# Patient Record
Sex: MALE
[De-identification: names, ages, dates, MRNs, and addresses within clinical notes are randomized per-mention and may not be internally consistent; named-entity substitution may affect disease eponyms.]

---

## 2018-10-26 ENCOUNTER — HOSPITAL ENCOUNTER (EMERGENCY)
Dept: HOSPITAL 14 - H.ER | Age: 43
Discharge: HOME | End: 2018-10-26
Payer: COMMERCIAL

## 2018-10-26 VITALS
OXYGEN SATURATION: 100 % | SYSTOLIC BLOOD PRESSURE: 128 MMHG | TEMPERATURE: 98.2 F | RESPIRATION RATE: 18 BRPM | DIASTOLIC BLOOD PRESSURE: 80 MMHG | HEART RATE: 95 BPM
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
## 2018-10-26 DIAGNOSIS — S80.222A: Primary | ICD-10-CM

## 2018-10-26 NOTE — ED PDOC
Lower Extremity Pain/Injury


Time Seen by Provider: 10/26/18 14:38


Chief Complaint (Nursing): Lower Extremity Problem/Injury


Chief Complaint (Provider): Lower Extremity Problem/Injury


History Per: Patient


History/Exam Limitations: no limitations


Onset/Duration Of Symptoms: Days (x1)


Current Symptoms Are (Timing): Still Present


Additional Complaint(s): 


Skip Flores, a 42 year old male, presents to the ED with left knee pain with 

pimple x 1 week. The patient denies fever, trauma, or antipyretic use.





PCP: none








Past Medical History


Reviewed: Historical Data, Nursing Documentation, Vital Signs


Vital Signs: 





                                Last Vital Signs











Temp  98.2 F   10/26/18 14:27


 


Pulse  95 H  10/26/18 14:27


 


Resp  18   10/26/18 14:27


 


BP  128/80   10/26/18 14:27


 


Pulse Ox  100   10/26/18 14:27














- Family History


Family History: States: Unknown Family Hx





- Home Medications


Home Medications: 


                                Ambulatory Orders











 Medication  Instructions  Recorded


 


RX: Clindamycin [Cleocin] 300 mg PO TID #21 cap 10/26/18














- Allergies


Allergies/Adverse Reactions: 


                                    Allergies











Allergy/AdvReac Type Severity Reaction Status Date / Time


 


antibiotic Allergy  RASH Uncoded 10/26/18 14:27














Review of Systems


ROS Statement: Except As Marked, All Systems Reviewed And Found Negative


Constitutional: Negative for: Fever


Musculoskeletal: Positive for: Leg Pain (Left knee with pimple,).  Negative for:

 Other (trauma)





Physical Exam





- Reviewed


Nursing Documentation Reviewed: Yes


Vital Signs Reviewed: Yes





- Physical Exam


Appears: Positive for: No Acute Distress


Pulses-Dorsalis Pedis (L): 2+


Pulses-Dorsalis Pedis (R): 2+


Extremity: Positive for: Normal ROM (left knee actively with one pustule).  

Negative for: Other (break in skin integrity, surrounding erythema, or 

fluctuance to left knee)





- ECG


O2 Sat by Pulse Oximetry: 100





- Progress


ED Course And Treament: 





Pt. states he is allergic to all antibiotics but is uncertain as to which 

specific antibiotic. Also states that he gets a variety of reactions such as 

itchiness or fever. Reports no hx of anaphylactic reactions. 


Pt. was observed in ED for 1 hr after taking Clindamycin 300mg PO without any 

development of rash, throat swelling, or itching.


Advised to f/u with [facility] or return to ED in 48 hours for wound check but is to 

return to ED immediately if symptoms worsen or fever develops. 





Medical Decision Making


Medical Decision Making: 


Time:1440


Initial Plan:


--Cleocin 300 mg PO 





Time: 1442


--Upon provider evaluation, patient is medically stable and requires no further 

treatment in the ED at this time. Patient will be discharged home with Rx for 

Cleocin 300mg. Counseling was provided and all questions were answeredregarding

 diagnosis and need for follow up with PMD in 1-2 days.There is agreement to laura meneses plan. Return if symptoms persist or worsen.








 

--------------------------------------------------------------------------------


-----------------


Scribe Attestation:


Documented byRafael Montes acting as a scribe for Abhishek Whitney PA-C





Provider Scribe Attestation:


All medical record entries made by the Scribe were at my direction and 

personally dictated by me. I have reviewed the chart and agree that the record 

accurately reflects my personal performance of the history, physical exam, 

medical decision making, and the department course for this patient. I have also

 personally directed, reviewed, and agree with the discharge instructions and 

disposition.





Disposition





- Clinical Impression


Clinical Impression: 


 Pustule








- Patient ED Disposition


Is Patient to be Admitted: No


Counseled Patient/Family Regarding: Diagnosis, Need For Followup, Rx Given





- Disposition


Referrals: 


[facility] [Outside]


[facility] [Outside]


Disposition: Routine/Home


Disposition Time: 14:52


Condition: STABLE


Additional Instructions: 


FOLLOW UP WITH YOUR PMD OR RETURN TO ED IN 48 HOURS FOR WOUND CHECK BUT RETURN 

IMMEDIATELY IF SWELLING WORSENS OR IF FEVER DEVELOPS





SKIP FLORES, thank you for letting us take care of you today. Your provider was

 Zack Ya MD and you were treated for KNEE PAIN. The emergency medical 

care you received today was directed at your acute symptoms. If you were 

prescribed any medication, please fill it and take as directed. It may take 

several days for your symptoms to resolve. Return to the Emergency Department if

 your symptoms worsen, do not improve, or if you have any other problems.





Please contact your doctor or call one of the physicians/clinics you have been 

referred to that are listed on the Patient Visit Information form that is 

included in your discharge packet. Bring any paperwork you were given at 

discharge with you along with any medications you are taking to your follow up 

visit. Our treatment cannot replace ongoing medical care by a primary care 

provider outside of the emergency department.





Thank you for allowing the [facility] team to be part of your care today.








If you had an X-Ray or CT scan: A Radiologist will review the ED reading if any 

change in treatment is needed we will contact you.***





If you had a blood, urine, or wound culture: It will take several days for the 

results, if any change in treatment is needed we will contact you.***





If you had an STI test: It will take 48 hours for the results. Please call after

 1 week if you have not heard back.***


Prescriptions: 


RX: Clindamycin [Cleocin] 300 mg PO TID #21 cap


Instructions:  Cellulitis (Skin Infection), Adult (DC)


Forms:  [employer] (English)


Print Language: ENGLISH

## 2018-10-29 ENCOUNTER — HOSPITAL ENCOUNTER (EMERGENCY)
Dept: HOSPITAL 14 - H.ER | Age: 43
Discharge: HOME | End: 2018-10-29
Payer: COMMERCIAL

## 2018-10-29 VITALS
OXYGEN SATURATION: 99 % | HEART RATE: 78 BPM | RESPIRATION RATE: 18 BRPM | TEMPERATURE: 98.7 F | SYSTOLIC BLOOD PRESSURE: 156 MMHG | DIASTOLIC BLOOD PRESSURE: 88 MMHG

## 2018-10-29 DIAGNOSIS — Z48.00: Primary | ICD-10-CM

## 2018-10-29 NOTE — ED PDOC
HPI: Wound Care





- HPI


Time Seen by Provider: 10/29/18 17:19


Chief Complaint (Nursing): Abnormal Skin Integrity


Chief Complaint (Provider): Wound check


History Per: Patient


Exam Limitations: no limitations


Onset/Duration Of Symptoms: Days (x4)


Additional Complaint(s): 


42 year old male presents to the ED for wound follow up.  Patient states he was 

seen here on Friday the 26th for an abscess/pustule to the left knee.  He was 

prescribed antibiotics which he started taking today.  He has no other 

complaints.  He reports the pustule ruptured yesterday which drained completely.

Patient took no medications for pain but is requesting a dose of Ibuprofen at 

this time. Patient notes swelling and pain have significantly improved since 

last visit. Denies fever/chills.





PMD: none





Past Medical History


Reviewed: Historical Data, Nursing Documentation, Vital Signs


Vital Signs: 





                                Last Vital Signs











Temp  98.7 F   10/29/18 15:35


 


Pulse  78   10/29/18 15:35


 


Resp  18   10/29/18 15:35


 


BP  156/88 H  10/29/18 15:35


 


Pulse Ox  99   10/29/18 15:35














- Medical History


PMH: No Chronic Diseases





- Surgical History


Surgical History: No Surg Hx





- Family History


Family History: States: Unknown Family Hx





- Home Medications


Home Medications: 


                                Ambulatory Orders











 Medication  Instructions  Recorded


 


RX: Clindamycin [Cleocin] 300 mg PO TID #21 cap 10/26/18


 


Acetaminophen [Acetaminophen 8 650 mg PO Q8 PRN #21 tablet.er 10/29/18





Hour]  


 


Bacitracin Ointment [Bacitracin] 1 applic TOP DAILY #1 tube 10/29/18














- Allergies


Allergies/Adverse Reactions: 


                                    Allergies











Allergy/AdvReac Type Severity Reaction Status Date / Time


 


antibiotic Allergy  RASH Uncoded 10/26/18 14:27














Review of Systems


ROS Statement: Except As Marked, All Systems Reviewed And Found Negative


Skin: Positive for: Other (wound check - left knee)





Physical Exam





- Reviewed


Nursing Documentation Reviewed: Yes


Vital Signs Reviewed: Yes





- Physical Exam


Comments: 


GENERAL APPEARANCE: Patient is awake, alert, oriented x 3, in no distress. 

Resting comfortably


Skin: 0.25 by 0.25cm area of erythema/avulsed skin to the anterior medial aspect

 of the left knee with full ROM.  (-) fluctuance, (-) edema, (-) surrounding 

erythema or warmth (-) drainage (-) tenderness.  Strength 5/5 bilaterally to the

 lower extremities.  


NECK: Supple, FROM


CHEST AND RESPIRATORY:  (-) rales, (-) rhonchi, (-) wheezes; breath sounds equal

 bilaterally. Respirations even and nonlabored. 


HEART AND CARDIOVASCULAR:  (-) irregularity


Extremities: full range of motion throughout, no tenderness. (+) distal pulses 

and sensation


NEURO AND PSYCH:  Mental status as above. Gait steady in ED. Speech: clear. (-) 

facial asymmetry (-) focal deficit.





- ECG


O2 Sat by Pulse Oximetry: 99 (RA)


Pulse Ox Interpretation: Normal





Medical Decision Making


Medical Decision Making: 


Initial Impression: Wound check


Initial Plan: 


--Ibuprofen 600mg PO





On exam, patient remains AAOx3, in no acute distress. Lungs clear to 

auscultation, cardiac RRR, repeat neuro exam shows no focal findings. Vitals 

stable. 


Lab/Diagnostic results d/w the patient in great detail. Diagnosis of wound check

 d/w the patient. 


Based on history, exam and diagnostic results, plan will be for outpatient 

follow up with clinic. Continue antibiotics from prior ED visit. 


Patient instructed to follow-up with pmd / referral provided / the clinic  in 1-

2 days without fail. Return to the emergency room at any time for any new or 

worsening symptoms. Patient states he fully agrees with and understands 

discharge instructions. States that he agrees with the plan and disposition. 

Verbalized and repeated discharge instructions and plan. I have given the 

patient opportunity to ask any additional questions.


------------------------------------------------------------------------------

-------------------


Scribe Attestation:


Documented by Yobani Espinoza acting as a scribe for Chanel SIGALA





Provider Scribe Attestation:


All medical record entries made by the Scribe were at my direction and 

personally dictated by me. I have reviewed the chart and agree that the record 

accurately reflects my personal performance of the history, physical exam, 

medical decision making, and the department course for this patient. I have also

 personally directed, reviewed, and agree with the discharge instructions and 

disposition.





Disposition





- Clinical Impression


Clinical Impression: 


 Visit for wound check








- Patient ED Disposition


Is Patient to be Admitted: No


Counseled Patient/Family Regarding: Studies Performed, Diagnosis, Need For 

Followup, Rx Given





- Disposition


Referrals: 


[facility] [Outside]


Disposition: Routine/Home


Disposition Time: 18:00


Condition: STABLE


Additional Instructions: 


The emergency medical care you received today was directed at your acute 

symptoms. If you were prescribed any medication, please fill it and take as 

directed. It may take several days for your symptoms to resolve. Return to the 

Emergency Department if your symptoms worsen, do not improve, or if you have any

other problems.





Please contact your doctor in 2 days for re-evaluation and follow up / or call 

one of the physicians/clinics you have been referred to that are listed on the 

Patient Visit Information form that is included in your discharge packet. Bring 

any paperwork you were given at discharge with you along with any medications 

you are taking to your follow up visit. Our treatment cannot replace ongoing 

medical care by a primary care provider (PCP) outside of the emergency 

department.


Prescriptions: 


Acetaminophen [Acetaminophen 8 Hour] 650 mg PO Q8 PRN #21 tablet.er


 PRN Reason: Pain, Moderate (4-7)


Bacitracin Ointment [Bacitracin] 1 applic TOP DAILY #1 tube


Instructions:  Wound Care, Cellulitis (Skin Infection), Adult (DC), Boil


Forms:  [employer] (English)


Print Language: ENGLISH





- POA


Present On Arrival: None